# Patient Record
Sex: FEMALE | Race: WHITE | Employment: OTHER | URBAN - METROPOLITAN AREA
[De-identification: names, ages, dates, MRNs, and addresses within clinical notes are randomized per-mention and may not be internally consistent; named-entity substitution may affect disease eponyms.]

---

## 2020-06-16 NOTE — PATIENT DISCUSSION
Patient calling reporting she woke with a widespread blotchy red rash from chest to toes. Reporting rash is flat non itchy. Unknown cause. Afebrile. Patient denies previous history.    Per guidelines advised to be seen today or tomorrow.     Warm transferred to [facility].    Soledad Wade RN  [facility]        COVID 19 Nurse Triage Plan/Patient Instructions    Please be aware that novel coronavirus (COVID-19) may be circulating in the community. If you develop symptoms such as fever, cough, or SOB or if you have concerns about the presence of another infection including coronavirus (COVID-19), please contact your health care provider or visit [email or web address].     Disposition/Instructions    Patient to schedule a Virtual Visit with provider. Reference Visit Selection Guide.    Thank you for taking steps to prevent the spread of this virus.  o Limit your contact with others.  o Wear a simple mask to cover your cough.  o Wash your hands well and often.    Resources    [facility]: About COVID-19: [email or web address]    CDC: What to Do If You're Sick: www.cdc.gov/coronavirus/2019-ncov/about/steps-when-sick.html    CDC: Ending Home Isolation: www.cdc.gov/coronavirus/2019-ncov/hcp/disposition-in-home-patients.html     CDC: Caring for Someone: www.cdc.gov/coronavirus/2019-ncov/if-you-are-sick/care-for-someone.html     [facility]: Interim Guidance for Hospital Discharge to Home: www.health.[facility].[place]/diseases/coronavirus/hcp/hospdischarge.pdf    [facility] clinical trials (COVID-19 research studies): [email or web address]     Below are the COVID-19 hotlines at the [place] [facility] ([facility]). Interpreters are available.   o For health questions: Call [phone] or 1-424.177.5428 (7 a.m. to 7 p.m.)  o For questions about schools and childcare: Call [phone] or 1-239.644.7477 (7 a.m. to 7 p.m.)                     Additional Information     [facility]. Negative: Sudden onset of rash (within last 2 hours) and difficulty with breathing or swallowing    Negative: Difficult to awaken or acting confused (e.g., disoriented, slurred speech)    Negative: Fever and purple or blood-colored spots or dots    Negative: Too weak or sick to stand    Negative: Life-threatening reaction (anaphylaxis) in the past to similar substance (e.g., food, insect bite/sting, chemical, etc.) and < 2 hours since exposure    Negative: Sounds like a life-threatening emergency to the triager    Negative: Insect bites suspected    Negative: Sunburn suspected    Negative: Hives suspected    Negative: Drug rash suspected and started taking new medicine within last 2 weeks (EXCEPTION: antihistamine, eye drops, ear drops, decongestant or other OTC cough/cold medicines)    Negative: Bright red, sunburn-like rash and current tampon use    Negative: Bright red, sunburn-like rash and current tampon use or nasal packing    Negative: Bright red, sunburn-like rash and wound infection or recent surgery    Negative: Bright red skin that peels off in sheets    Negative: Stiff neck (can't touch chin to chest)    Negative: Patient sounds very sick or weak to the triager    Negative: Fever    Negative: Face becomes swollen    Negative: Headache    Negative: Purple or blood-colored spots or dots (no fever and sounds well to triager)    Negative: Joint pain or swelling    Negative: Sores in mouth    Negative: Rash looks like large or small blisters (i.e., fluid filled bubbles or sacs on the skin)    Negative: Pregnant    Negative: Rash began within 4 hours of a new prescription medication    Negative: Severe itching    Negative: Sore throat    Negative: Ring-like appearance of rash (or ask: does it look like a 'target' or 'bulls-eye')    Negative: Patient wants to be seen    Mild widespread rash    Protocols used: RASH OR REDNESS - WIDESPREAD-A-OH

## 2020-07-29 ENCOUNTER — CATARACT CONSULT (OUTPATIENT)
Dept: URBAN - METROPOLITAN AREA CLINIC 39 | Facility: CLINIC | Age: 80
End: 2020-07-29

## 2020-07-29 DIAGNOSIS — H52.13: ICD-10-CM

## 2020-07-29 DIAGNOSIS — H04.123: ICD-10-CM

## 2020-07-29 DIAGNOSIS — H25.811: ICD-10-CM

## 2020-07-29 DIAGNOSIS — H25.812: ICD-10-CM

## 2020-07-29 PROCEDURE — 92134 CPTRZ OPH DX IMG PST SGM RTA: CPT

## 2020-07-29 PROCEDURE — 92025-2 CORNEAL TOPOGRAPHY, PT

## 2020-07-29 PROCEDURE — 92136TC INTERFEROMETRY - TECHNICAL COMPONENT

## 2020-07-29 PROCEDURE — 99204 OFFICE O/P NEW MOD 45 MIN: CPT

## 2020-07-29 PROCEDURE — V2799PMN IMPRIMIS PRED-MOXI-NEPAF 5ML

## 2020-07-29 ASSESSMENT — TONOMETRY
OD_IOP_MMHG: 14
OS_IOP_MMHG: 16

## 2020-07-29 ASSESSMENT — VISUAL ACUITY
OS_PH: 20/40+2
OS_CC: 20/60+2
OD_SC: CF 6FT
OD_RAM: 20/30-2
OD_PH: 20/40
OS_SC: CF 6FT
OS_BAT: <20/400
OD_CC: 20/70-2
OS_AM: 20/25-2
OD_BAT: <20/400

## 2020-08-03 ENCOUNTER — RETINA CONSULT (OUTPATIENT)
Dept: URBAN - METROPOLITAN AREA CLINIC 39 | Facility: CLINIC | Age: 80
End: 2020-08-03

## 2020-08-03 DIAGNOSIS — H35.09: ICD-10-CM

## 2020-08-03 DIAGNOSIS — H35.363: ICD-10-CM

## 2020-08-03 DIAGNOSIS — H33.321: ICD-10-CM

## 2020-08-03 DIAGNOSIS — H35.30: ICD-10-CM

## 2020-08-03 DIAGNOSIS — H01.003: ICD-10-CM

## 2020-08-03 DIAGNOSIS — H35.033: ICD-10-CM

## 2020-08-03 PROCEDURE — 99214 OFFICE O/P EST MOD 30 MIN: CPT

## 2020-08-03 PROCEDURE — 67145 PROPH RTA DTCHMNT PC: CPT

## 2020-08-03 PROCEDURE — 92235 FLUORESCEIN ANGRPH MLTIFRAME: CPT

## 2020-08-03 PROCEDURE — 92250 FUNDUS PHOTOGRAPHY W/I&R: CPT

## 2020-08-03 ASSESSMENT — VISUAL ACUITY
OD_CC: 20/100-1
OS_CC: 20/50-1
OS_PH: 20/50
OD_PH: 20/70-1

## 2020-08-03 ASSESSMENT — TONOMETRY
OD_IOP_MMHG: 14
OS_IOP_MMHG: 14

## 2020-08-10 ENCOUNTER — CONTACT LENS FOLLOW UP (OUTPATIENT)
Dept: URBAN - METROPOLITAN AREA CLINIC 35 | Facility: CLINIC | Age: 80
End: 2020-08-10

## 2020-08-10 DIAGNOSIS — H52.12: ICD-10-CM

## 2020-08-10 DIAGNOSIS — H52.11: ICD-10-CM

## 2020-08-10 PROCEDURE — 92310F

## 2020-08-10 RX ORDER — ERYTHROMYCIN 5 MG/G: OINTMENT OPHTHALMIC EVERY EVENING

## 2020-08-10 ASSESSMENT — VISUAL ACUITY
OU_CC: 20/40
OD_CC: 20/80
OU_CC: J2
OD_CC: J2
OS_CC: J16
OS_CC: 20/40

## 2020-08-17 ENCOUNTER — PRE-OP/H&P (OUTPATIENT)
Dept: URBAN - METROPOLITAN AREA CLINIC 39 | Facility: CLINIC | Age: 80
End: 2020-08-17

## 2020-08-17 ENCOUNTER — SURGERY/PROCEDURE (OUTPATIENT)
Dept: URBAN - METROPOLITAN AREA CLINIC 39 | Facility: CLINIC | Age: 80
End: 2020-08-17

## 2020-08-17 DIAGNOSIS — H25.811: ICD-10-CM

## 2020-08-17 DIAGNOSIS — H25.812: ICD-10-CM

## 2020-08-17 PROCEDURE — 66984CV REMOVE CATARACT, INSERT LENS, CUSTOM VISION

## 2020-08-17 PROCEDURE — 99211HP H&P OFFICE/OUTPATIENT VISIT, EST

## 2020-08-17 PROCEDURE — 66999LNSR LENSAR LASER FOR CAT SX

## 2020-08-18 ENCOUNTER — CATARACT POST-OP 1-DAY (OUTPATIENT)
Dept: URBAN - METROPOLITAN AREA CLINIC 35 | Facility: CLINIC | Age: 80
End: 2020-08-18

## 2020-08-18 DIAGNOSIS — H25.812: ICD-10-CM

## 2020-08-18 DIAGNOSIS — Z96.1: ICD-10-CM

## 2020-08-18 PROCEDURE — 99024 POSTOP FOLLOW-UP VISIT: CPT

## 2020-08-18 ASSESSMENT — VISUAL ACUITY
OS_SC: J8
OD_SC: J2
OS_SC: CF 6FT
OU_SC: 20/80
OD_SC: 20/80
OU_SC: J1

## 2020-08-18 ASSESSMENT — TONOMETRY
OD_IOP_MMHG: 16
OS_IOP_MMHG: 15

## 2020-08-24 ENCOUNTER — POST-OP (OUTPATIENT)
Dept: URBAN - METROPOLITAN AREA CLINIC 39 | Facility: CLINIC | Age: 80
End: 2020-08-24

## 2020-08-24 ENCOUNTER — SURGERY/PROCEDURE (OUTPATIENT)
Dept: URBAN - METROPOLITAN AREA CLINIC 39 | Facility: CLINIC | Age: 80
End: 2020-08-24

## 2020-08-24 DIAGNOSIS — H25.812: ICD-10-CM

## 2020-08-24 DIAGNOSIS — Z96.1: ICD-10-CM

## 2020-08-24 DIAGNOSIS — H57.03: ICD-10-CM

## 2020-08-24 PROCEDURE — 99024 POSTOP FOLLOW-UP VISIT: CPT

## 2020-08-24 PROCEDURE — 66999LNSR LENSAR LASER FOR CAT SX

## 2020-08-24 PROCEDURE — 66982CV COMPLEX CATARACT WITH IOL, CUSTOM VISION

## 2020-08-24 ASSESSMENT — VISUAL ACUITY
OD_SC: 20/80-1
OD_SC: J1

## 2020-08-24 ASSESSMENT — TONOMETRY: OD_IOP_MMHG: 17

## 2020-08-25 ENCOUNTER — CATARACT POST-OP 1-DAY (OUTPATIENT)
Dept: URBAN - METROPOLITAN AREA CLINIC 35 | Facility: CLINIC | Age: 80
End: 2020-08-25

## 2020-08-25 DIAGNOSIS — Z96.1: ICD-10-CM

## 2020-08-25 PROCEDURE — 99024 POSTOP FOLLOW-UP VISIT: CPT

## 2020-08-25 ASSESSMENT — VISUAL ACUITY
OS_SC: 20/40-1
OD_SC: J1+
OD_SC: 20/80
OS_SC: J1
OU_SC: J1
OU_SC: 20/40

## 2020-08-25 ASSESSMENT — TONOMETRY
OS_IOP_MMHG: 16
OD_IOP_MMHG: 16

## 2020-08-27 ENCOUNTER — EST. PATIENT EMERGENCY (OUTPATIENT)
Dept: URBAN - METROPOLITAN AREA CLINIC 35 | Facility: CLINIC | Age: 80
End: 2020-08-27

## 2020-08-27 DIAGNOSIS — S05.02XA: ICD-10-CM

## 2020-08-27 PROCEDURE — 92012 INTRM OPH EXAM EST PATIENT: CPT

## 2020-08-27 ASSESSMENT — VISUAL ACUITY
OD_SC: 20/70-1
OS_SC: 20/40-1
OU_SC: 20/50-1

## 2020-08-27 ASSESSMENT — TONOMETRY
OS_IOP_MMHG: 18
OD_IOP_MMHG: 16

## 2020-08-28 ENCOUNTER — POST-OP (OUTPATIENT)
Dept: URBAN - METROPOLITAN AREA CLINIC 35 | Facility: CLINIC | Age: 80
End: 2020-08-28

## 2020-08-28 DIAGNOSIS — S05.02XA: ICD-10-CM

## 2020-08-28 DIAGNOSIS — Z96.1: ICD-10-CM

## 2020-08-28 ASSESSMENT — VISUAL ACUITY
OD_SC: 20/100
OS_SC: 20/40-2
OU_SC: J1
OU_SC: 20/40

## 2020-09-21 ENCOUNTER — POST-OP (OUTPATIENT)
Dept: URBAN - METROPOLITAN AREA CLINIC 35 | Facility: CLINIC | Age: 80
End: 2020-09-21

## 2020-09-21 DIAGNOSIS — H26.40: ICD-10-CM

## 2020-09-21 DIAGNOSIS — Z96.1: ICD-10-CM

## 2020-09-21 PROCEDURE — 99024 POSTOP FOLLOW-UP VISIT: CPT

## 2020-09-21 ASSESSMENT — VISUAL ACUITY
OD_PH: 20/60
OU_SC: 20/40-2
OS_PH: 20/25-1
OD_SC: 20/100
OS_SC: 20/50-1

## 2020-09-21 ASSESSMENT — TONOMETRY
OS_IOP_MMHG: 14
OD_IOP_MMHG: 14

## 2020-10-20 ENCOUNTER — ESTABLISHED COMPREHENSIVE EXAM (OUTPATIENT)
Dept: URBAN - METROPOLITAN AREA CLINIC 35 | Facility: CLINIC | Age: 80
End: 2020-10-20

## 2020-10-20 DIAGNOSIS — H35.033: ICD-10-CM

## 2020-10-20 DIAGNOSIS — Z96.1: ICD-10-CM

## 2020-10-20 DIAGNOSIS — H35.363: ICD-10-CM

## 2020-10-20 DIAGNOSIS — H26.40: ICD-10-CM

## 2020-10-20 PROCEDURE — 92134 CPTRZ OPH DX IMG PST SGM RTA: CPT

## 2020-10-20 PROCEDURE — 92014 COMPRE OPH EXAM EST PT 1/>: CPT

## 2020-10-20 ASSESSMENT — VISUAL ACUITY
OD_SC: J1
OS_SC: 20/40+2
OU_SC: J1
OS_SC: J2
OD_SC: 20/200
OU_SC: 20/30-2

## 2020-10-20 ASSESSMENT — KERATOMETRY
OS_K1POWER_DIOPTERS: 45.75
OD_K1POWER_DIOPTERS: 45.75
OS_K2POWER_DIOPTERS: 37.75
OD_K2POWER_DIOPTERS: 36.5

## 2020-10-20 ASSESSMENT — TONOMETRY
OD_IOP_MMHG: 14
OS_IOP_MMHG: 14

## 2020-11-18 ENCOUNTER — SURGERY/PROCEDURE (OUTPATIENT)
Dept: URBAN - METROPOLITAN AREA SURGERY 14 | Facility: SURGERY | Age: 80
End: 2020-11-18

## 2020-11-18 ENCOUNTER — CONSULT (OUTPATIENT)
Dept: URBAN - METROPOLITAN AREA CLINIC 39 | Facility: CLINIC | Age: 80
End: 2020-11-18

## 2020-11-18 DIAGNOSIS — H26.493: ICD-10-CM

## 2020-11-18 PROCEDURE — 6682150 YAG CAPSULOTOMY

## 2020-11-18 PROCEDURE — 92014 COMPRE OPH EXAM EST PT 1/>: CPT

## 2020-11-18 ASSESSMENT — KERATOMETRY
OS_K1POWER_DIOPTERS: 45.75
OD_K2POWER_DIOPTERS: 36.5
OS_K2POWER_DIOPTERS: 37.75
OD_K1POWER_DIOPTERS: 45.75

## 2020-11-18 ASSESSMENT — VISUAL ACUITY
OS_SC: J5
OD_SC: J2
OD_SC: 20/100
OS_BAT: 20/60
OS_SC: 20/50-1
OD_BAT: 20/60

## 2020-11-18 ASSESSMENT — TONOMETRY
OD_IOP_MMHG: 15
OS_IOP_MMHG: 15

## 2020-11-24 ASSESSMENT — KERATOMETRY
OS_K1POWER_DIOPTERS: 45.75
OD_K1POWER_DIOPTERS: 45.75
OS_K1POWER_DIOPTERS: 45.75
OS_K2POWER_DIOPTERS: 37.75
OD_K2POWER_DIOPTERS: 36.5
OD_K1POWER_DIOPTERS: 45.75
OS_K2POWER_DIOPTERS: 37.75
OD_K2POWER_DIOPTERS: 36.5

## 2020-11-25 ENCOUNTER — YAG POST-OP (OUTPATIENT)
Dept: URBAN - METROPOLITAN AREA CLINIC 35 | Facility: CLINIC | Age: 80
End: 2020-11-25

## 2020-11-25 DIAGNOSIS — Z96.1: ICD-10-CM

## 2020-11-25 DIAGNOSIS — Z98.890: ICD-10-CM

## 2020-11-25 PROCEDURE — 99024 POSTOP FOLLOW-UP VISIT: CPT

## 2020-11-25 ASSESSMENT — VISUAL ACUITY
OD_SC: J1
OD_SC: 20/200+1
OS_SC: J3
OS_SC: 20/30-2
OU_SC: 20/30-2
OU_SC: J1

## 2020-11-25 ASSESSMENT — TONOMETRY
OD_IOP_MMHG: 18
OS_IOP_MMHG: 18

## 2021-07-12 ENCOUNTER — EST. PATIENT EMERGENCY (OUTPATIENT)
Dept: URBAN - METROPOLITAN AREA CLINIC 35 | Facility: CLINIC | Age: 81
End: 2021-07-12

## 2021-07-12 DIAGNOSIS — H35.363: ICD-10-CM

## 2021-07-12 DIAGNOSIS — H04.123: ICD-10-CM

## 2021-07-12 DIAGNOSIS — H52.13: ICD-10-CM

## 2021-07-12 DIAGNOSIS — H00.022: ICD-10-CM

## 2021-07-12 PROCEDURE — 92012 INTRM OPH EXAM EST PATIENT: CPT

## 2021-07-12 PROCEDURE — 92015 DETERMINE REFRACTIVE STATE: CPT

## 2021-07-12 PROCEDURE — 92134 CPTRZ OPH DX IMG PST SGM RTA: CPT

## 2021-07-12 RX ORDER — NEOMYCIN SULFATE, POLYMYXIN B SULFATE AND DEXAMETHASONE 3.5; 10000; 1 MG/G; [USP'U]/G; MG/G
OINTMENT OPHTHALMIC TWICE A DAY
Start: 2021-07-12 | End: 2021-07-19

## 2021-07-12 ASSESSMENT — VISUAL ACUITY
OS_SC: 20/25-1
OD_SC: 20/100

## 2021-07-12 ASSESSMENT — KERATOMETRY
OD_K2POWER_DIOPTERS: 36.5
OD_K1POWER_DIOPTERS: 45.75
OS_K1POWER_DIOPTERS: 45.75
OS_K2POWER_DIOPTERS: 37.75

## 2021-08-16 ENCOUNTER — FOLLOW UP (OUTPATIENT)
Dept: URBAN - METROPOLITAN AREA CLINIC 35 | Facility: CLINIC | Age: 81
End: 2021-08-16

## 2021-08-16 DIAGNOSIS — H00.12: ICD-10-CM

## 2021-08-16 PROCEDURE — 92012 INTRM OPH EXAM EST PATIENT: CPT

## 2021-08-16 ASSESSMENT — KERATOMETRY
OD_K2POWER_DIOPTERS: 36.5
OS_K2POWER_DIOPTERS: 37.75
OS_K1POWER_DIOPTERS: 45.75
OD_K1POWER_DIOPTERS: 45.75

## 2021-08-16 ASSESSMENT — TONOMETRY
OD_IOP_MMHG: 11
OS_IOP_MMHG: 13

## 2021-08-16 ASSESSMENT — VISUAL ACUITY: OS_SC: 20/40

## 2021-08-25 ENCOUNTER — ESTABLISHED PATIENT (OUTPATIENT)
Dept: URBAN - METROPOLITAN AREA CLINIC 35 | Facility: CLINIC | Age: 81
End: 2021-08-25

## 2021-08-25 DIAGNOSIS — H00.12: ICD-10-CM

## 2021-08-25 PROCEDURE — 67800 REMOVE EYELID LESION: CPT

## 2021-08-25 PROCEDURE — 92285 EXTERNAL OCULAR PHOTOGRAPHY: CPT

## 2021-08-25 PROCEDURE — 99213 OFFICE O/P EST LOW 20 MIN: CPT

## 2021-08-25 RX ORDER — NEOMYCIN SULFATE, POLYMYXIN B SULFATE AND DEXAMETHASONE 3.5; 10000; 1 MG/ML; [USP'U]/ML; MG/ML
1 SUSPENSION OPHTHALMIC TWICE A DAY
Start: 2021-08-25 | End: 2021-09-08

## 2021-08-25 ASSESSMENT — VISUAL ACUITY
OS_SC: 20/40+2
OD_PH: 20/50-2
OD_SC: 20/200

## 2021-09-10 ENCOUNTER — EST. PATIENT EMERGENCY (OUTPATIENT)
Dept: URBAN - METROPOLITAN AREA CLINIC 35 | Facility: CLINIC | Age: 81
End: 2021-09-10

## 2021-09-10 DIAGNOSIS — T15.11XA: ICD-10-CM

## 2021-09-10 PROCEDURE — 92012 INTRM OPH EXAM EST PATIENT: CPT

## 2021-09-10 ASSESSMENT — VISUAL ACUITY
OD_SC: J2
OS_SC: 20/40-1
OD_PH: 20/70
OS_SC: J2
OS_PH: 20/25-2

## 2021-09-10 ASSESSMENT — KERATOMETRY
OS_K2POWER_DIOPTERS: 37.75
OD_K2POWER_DIOPTERS: 36.5
OD_K1POWER_DIOPTERS: 45.75
OS_K1POWER_DIOPTERS: 45.75

## 2021-09-15 ENCOUNTER — FOLLOW UP (OUTPATIENT)
Dept: URBAN - METROPOLITAN AREA CLINIC 35 | Facility: CLINIC | Age: 81
End: 2021-09-15

## 2021-09-15 DIAGNOSIS — H00.12: ICD-10-CM

## 2021-09-15 DIAGNOSIS — H00.022: ICD-10-CM

## 2021-09-15 PROCEDURE — 99213 OFFICE O/P EST LOW 20 MIN: CPT

## 2021-09-15 PROCEDURE — 92285 EXTERNAL OCULAR PHOTOGRAPHY: CPT

## 2022-06-22 ENCOUNTER — COMPREHENSIVE EXAM (OUTPATIENT)
Dept: URBAN - METROPOLITAN AREA CLINIC 35 | Facility: CLINIC | Age: 82
End: 2022-06-22

## 2022-06-22 DIAGNOSIS — H01.02B: ICD-10-CM

## 2022-06-22 DIAGNOSIS — H33.321: ICD-10-CM

## 2022-06-22 DIAGNOSIS — H01.02A: ICD-10-CM

## 2022-06-22 DIAGNOSIS — H35.09: ICD-10-CM

## 2022-06-22 DIAGNOSIS — H04.123: ICD-10-CM

## 2022-06-22 DIAGNOSIS — H52.13: ICD-10-CM

## 2022-06-22 DIAGNOSIS — H35.363: ICD-10-CM

## 2022-06-22 PROCEDURE — 92015 DETERMINE REFRACTIVE STATE: CPT

## 2022-06-22 PROCEDURE — 92134 CPTRZ OPH DX IMG PST SGM RTA: CPT

## 2022-06-22 PROCEDURE — 92014 COMPRE OPH EXAM EST PT 1/>: CPT

## 2022-06-22 ASSESSMENT — TONOMETRY
OS_IOP_MMHG: 13
OD_IOP_MMHG: 12

## 2022-06-22 ASSESSMENT — KERATOMETRY
OD_AXISANGLE_DEGREES: 50
OS_AXISANGLE2_DEGREES: 25
OS_K1POWER_DIOPTERS: 44.00
OD_K2POWER_DIOPTERS: 45.50
OD_K1POWER_DIOPTERS: 44.75
OS_AXISANGLE_DEGREES: 115
OS_K2POWER_DIOPTERS: 45.00
OD_AXISANGLE2_DEGREES: 140

## 2022-06-22 ASSESSMENT — VISUAL ACUITY
OS_PH: 20/25
OU_SC: J1-
OD_SC: 20/200
OS_SC: 20/50-1
OS_SC: J4
OU_SC: 20/50
OD_PH: 20/40-1
OD_SC: J2

## 2022-08-04 ENCOUNTER — EMERGENCY VISIT (OUTPATIENT)
Dept: URBAN - METROPOLITAN AREA CLINIC 35 | Facility: CLINIC | Age: 82
End: 2022-08-04

## 2022-08-04 DIAGNOSIS — H01.02B: ICD-10-CM

## 2022-08-04 DIAGNOSIS — H01.02A: ICD-10-CM

## 2022-08-04 PROCEDURE — 92012 INTRM OPH EXAM EST PATIENT: CPT

## 2022-08-04 RX ORDER — LOTEPREDNOL ETABONATE 3.8 MG/G
1 GEL OPHTHALMIC TWICE A DAY
Start: 2022-08-04

## 2022-08-04 ASSESSMENT — VISUAL ACUITY
OD_SC: 20/200-1
OU_SC: 20/40
OD_PH: 20/60+2
OS_PH: 20/40-1
OS_SC: 20/50-2

## 2022-08-04 ASSESSMENT — KERATOMETRY
OD_AXISANGLE2_DEGREES: 140
OS_AXISANGLE2_DEGREES: 25
OD_K1POWER_DIOPTERS: 44.75
OD_K2POWER_DIOPTERS: 45.50
OS_K2POWER_DIOPTERS: 45.00
OD_AXISANGLE_DEGREES: 50
OS_K1POWER_DIOPTERS: 44.00
OS_AXISANGLE_DEGREES: 115

## 2022-08-04 ASSESSMENT — TONOMETRY
OD_IOP_MMHG: 13
OS_IOP_MMHG: 13

## 2022-09-12 ENCOUNTER — EMERGENCY VISIT (OUTPATIENT)
Dept: URBAN - METROPOLITAN AREA CLINIC 35 | Facility: CLINIC | Age: 82
End: 2022-09-12

## 2022-09-12 DIAGNOSIS — H00.022: ICD-10-CM

## 2022-09-12 DIAGNOSIS — H01.02B: ICD-10-CM

## 2022-09-12 DIAGNOSIS — H00.12: ICD-10-CM

## 2022-09-12 DIAGNOSIS — H01.02A: ICD-10-CM

## 2022-09-12 DIAGNOSIS — H04.123: ICD-10-CM

## 2022-09-12 PROCEDURE — 92012 INTRM OPH EXAM EST PATIENT: CPT

## 2022-09-12 ASSESSMENT — KERATOMETRY
OD_AXISANGLE2_DEGREES: 140
OS_AXISANGLE2_DEGREES: 25
OS_K2POWER_DIOPTERS: 45.00
OS_AXISANGLE_DEGREES: 115
OD_K1POWER_DIOPTERS: 44.75
OS_K1POWER_DIOPTERS: 44.00
OD_K2POWER_DIOPTERS: 45.50
OD_AXISANGLE_DEGREES: 50

## 2022-09-12 ASSESSMENT — TONOMETRY
OS_IOP_MMHG: 12
OD_IOP_MMHG: 12

## 2022-09-12 ASSESSMENT — VISUAL ACUITY
OD_PH: 20/60-1
OD_SC: 20/70
OS_SC: 20/30-2

## 2022-09-14 ENCOUNTER — ESTABLISHED PATIENT (OUTPATIENT)
Dept: URBAN - METROPOLITAN AREA CLINIC 35 | Facility: CLINIC | Age: 82
End: 2022-09-14

## 2022-09-14 DIAGNOSIS — H01.116: ICD-10-CM

## 2022-09-14 DIAGNOSIS — H00.12: ICD-10-CM

## 2022-09-14 DIAGNOSIS — H01.003: ICD-10-CM

## 2022-09-14 DIAGNOSIS — H04.123: ICD-10-CM

## 2022-09-14 DIAGNOSIS — H01.113: ICD-10-CM

## 2022-09-14 DIAGNOSIS — H01.006: ICD-10-CM

## 2022-09-14 PROCEDURE — 99213 OFFICE O/P EST LOW 20 MIN: CPT

## 2022-09-14 PROCEDURE — 92285 EXTERNAL OCULAR PHOTOGRAPHY: CPT

## 2022-09-14 ASSESSMENT — VISUAL ACUITY
OD_SC: 20/70
OS_SC: 20/30-2
OD_PH: 20/60-1

## 2022-09-14 ASSESSMENT — KERATOMETRY
OD_K2POWER_DIOPTERS: 45.50
OS_AXISANGLE_DEGREES: 115
OS_K1POWER_DIOPTERS: 44.00
OS_K2POWER_DIOPTERS: 45.00
OD_AXISANGLE_DEGREES: 50
OD_AXISANGLE2_DEGREES: 140
OS_AXISANGLE2_DEGREES: 25
OD_K1POWER_DIOPTERS: 44.75

## 2023-06-28 ENCOUNTER — ESTABLISHED PATIENT (OUTPATIENT)
Dept: URBAN - METROPOLITAN AREA CLINIC 35 | Facility: CLINIC | Age: 83
End: 2023-06-28